# Patient Record
Sex: MALE | Race: WHITE | NOT HISPANIC OR LATINO | Employment: FULL TIME | URBAN - METROPOLITAN AREA
[De-identification: names, ages, dates, MRNs, and addresses within clinical notes are randomized per-mention and may not be internally consistent; named-entity substitution may affect disease eponyms.]

---

## 2019-02-06 ENCOUNTER — TRANSCRIBE ORDERS (OUTPATIENT)
Dept: ADMINISTRATIVE | Facility: HOSPITAL | Age: 29
End: 2019-02-06

## 2019-02-06 ENCOUNTER — LAB (OUTPATIENT)
Dept: LAB | Facility: HOSPITAL | Age: 29
End: 2019-02-06

## 2019-02-06 ENCOUNTER — HOSPITAL ENCOUNTER (OUTPATIENT)
Dept: GENERAL RADIOLOGY | Facility: HOSPITAL | Age: 29
Discharge: HOME | End: 2019-02-06

## 2019-02-06 ENCOUNTER — HOSPITAL ENCOUNTER (OUTPATIENT)
Dept: CARDIOLOGY | Facility: HOSPITAL | Age: 29
Discharge: HOME | End: 2019-02-06
Admitting: ORTHOPAEDIC SURGERY

## 2019-02-06 DIAGNOSIS — Z01.811 PRE-OP CHEST EXAM: ICD-10-CM

## 2019-02-06 DIAGNOSIS — Z01.818 PRE-OP TESTING: ICD-10-CM

## 2019-02-06 DIAGNOSIS — M25.562 LEFT KNEE PAIN, UNSPECIFIED CHRONICITY: ICD-10-CM

## 2019-02-06 DIAGNOSIS — M25.562 LEFT KNEE PAIN, UNSPECIFIED CHRONICITY: Primary | ICD-10-CM

## 2019-02-06 LAB
ANION GAP SERPL CALCULATED.3IONS-SCNC: 14 MMOL/L
BUN BLD-MCNC: 13 MG/DL (ref 6–20)
BUN/CREAT SERPL: 14.3 (ref 7–25)
CALCIUM SPEC-SCNC: 9.3 MG/DL (ref 8.6–10.5)
CHLORIDE SERPL-SCNC: 107 MMOL/L (ref 98–107)
CO2 SERPL-SCNC: 22 MMOL/L (ref 22–29)
CREAT BLD-MCNC: 0.91 MG/DL (ref 0.76–1.27)
DEPRECATED RDW RBC AUTO: 38.8 FL (ref 37–54)
ERYTHROCYTE [DISTWIDTH] IN BLOOD BY AUTOMATED COUNT: 12.8 % (ref 11.5–14.5)
GFR SERPL CREATININE-BSD FRML MDRD: 99 ML/MIN/1.73
GLUCOSE BLD-MCNC: 113 MG/DL (ref 65–99)
HCT VFR BLD AUTO: 42.5 % (ref 40.4–52.2)
HGB BLD-MCNC: 15 G/DL (ref 13.7–17.6)
MCH RBC QN AUTO: 29.9 PG (ref 27–32.7)
MCHC RBC AUTO-ENTMCNC: 35.3 G/DL (ref 32.6–36.4)
MCV RBC AUTO: 84.7 FL (ref 79.8–96.2)
PLATELET # BLD AUTO: 241 10*3/MM3 (ref 140–500)
PMV BLD AUTO: 10.9 FL (ref 6–12)
POTASSIUM BLD-SCNC: 4 MMOL/L (ref 3.5–5.2)
RBC # BLD AUTO: 5.02 10*6/MM3 (ref 4.6–6)
SODIUM BLD-SCNC: 143 MMOL/L (ref 136–145)
WBC NRBC COR # BLD: 5.39 10*3/MM3 (ref 4.5–10.7)

## 2019-02-06 PROCEDURE — 80048 BASIC METABOLIC PNL TOTAL CA: CPT

## 2019-02-06 PROCEDURE — 93010 ELECTROCARDIOGRAM REPORT: CPT | Performed by: INTERNAL MEDICINE

## 2019-02-06 PROCEDURE — 36415 COLL VENOUS BLD VENIPUNCTURE: CPT

## 2019-02-06 PROCEDURE — 93005 ELECTROCARDIOGRAM TRACING: CPT | Performed by: ORTHOPAEDIC SURGERY

## 2019-02-06 PROCEDURE — 85027 COMPLETE CBC AUTOMATED: CPT

## 2019-02-06 PROCEDURE — 87081 CULTURE SCREEN ONLY: CPT

## 2019-02-06 PROCEDURE — 71046 X-RAY EXAM CHEST 2 VIEWS: CPT

## 2019-02-08 LAB — MRSA SPEC QL CULT: NORMAL

## 2019-07-02 ENCOUNTER — TRANSCRIBE ORDERS (OUTPATIENT)
Dept: PHYSICAL THERAPY | Facility: HOSPITAL | Age: 29
End: 2019-07-02

## 2019-07-02 DIAGNOSIS — M25.562 LEFT KNEE PAIN, UNSPECIFIED CHRONICITY: Primary | ICD-10-CM

## 2019-07-11 ENCOUNTER — HOSPITAL ENCOUNTER (OUTPATIENT)
Dept: PHYSICAL THERAPY | Facility: HOSPITAL | Age: 29
Setting detail: THERAPIES SERIES
Discharge: HOME | End: 2019-07-11

## 2019-07-11 DIAGNOSIS — G89.29 CHRONIC PAIN OF LEFT KNEE: Primary | ICD-10-CM

## 2019-07-11 DIAGNOSIS — R26.2 DIFFICULTY WALKING: ICD-10-CM

## 2019-07-11 DIAGNOSIS — M25.562 CHRONIC PAIN OF LEFT KNEE: Primary | ICD-10-CM

## 2019-07-11 PROCEDURE — 97161 PT EVAL LOW COMPLEX 20 MIN: CPT | Performed by: PHYSICAL THERAPIST

## 2019-07-11 PROCEDURE — 97110 THERAPEUTIC EXERCISES: CPT | Performed by: PHYSICAL THERAPIST

## 2019-07-11 NOTE — THERAPY EVALUATION
"    Outpatient Physical Therapy Ortho Initial Evaluation  [facility]     Patient Name: Jack Cooper  : 1990  MRN: [id number]  Today's Date: 2019      Visit Date: 2019    There is no problem list on file for this patient.       No past medical history on file.     No past surgical history on file.    Visit Dx:     ICD-10-CM ICD-9-CM   1. Chronic pain of left knee M25.562 719.46    G89.29 338.29   2. Difficulty walking R26.2 719.7         Patient History     Row Name 19 1500             History    Chief Complaint  Pain  -      Type of Pain  Knee pain  -      Date Current Problem(s) Began  -- 2018  -      Brief Description of Current Complaint  Patient injured his knee back in 2018. 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 His knee swelled up badly and there was no medical attention on the 10+ hour flight. By the time he landed the swelling/pain had temporarily subsided. He never felt like the pain was gone and after a week or so the pain slowly began worsening again, likely from his daily job duties. He went to a urgent care type clinic and they referred him to an Ortho MD. He took an MRI which showed a meniscocapsular tear. Patient had a knee scope in February but when the surgeon went in it wasn't the injury he thought it was so he sewed him back up without fixing anything. His MD just recommended PT.    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  Yes, Dr. Wasserman  -      How has patient tried to help current problem?  surgery  -      What clinical " tests have you had for this problem?  MRI  -         Pain     Pain Location  Knee  -KH      Pain at Present  3  -KH      Pain at Best  3  -KH      Pain at Worst  8  -KH      Pain Frequency  Constant/continuous  -KH      Pain Description  Dull;Throbbing  -KH      What Performance Factors Make the Current Problem(s) WORSE?  bending, walking, standing   -KH      What Performance Factors Make the Current Problem(s) BETTER?  change position  -KH      Is your sleep disturbed?  No  -KH      Difficulties at work?  yes, unable to complete job duties  -KH      Difficulties with ADL's?  no  -KH      Difficulties with recreational activities?  yes-unable to hike/camp, runs/works out  -KH         Fall Risk Assessment    Any falls in the past year:  No  -KH         Daily Activities    Primary Language  English  -KH      Patient is concerned about/has problems with  Climbing Stairs;Performing home management (household chores, shopping, care of dependents);Performing job responsibilities/community activities (work, school,;Performing sports, recreation, and play activities;Standing;Transfers (getting out of a chair, bed);Walking  -KH      Pt Participated in POC and Goals  Yes  -KH         Safety    Are you being hurt, hit, or frightened by anyone at home or in your life?  No  -KH      Are you being neglected by a caregiver  No  -KH        User Key  (r) = Recorded By, (t) = Taken By, (c) = Cosigned By    Initials Name Provider Type    Nilam Menendez, PT Physical Therapist          PT Ortho     Row Name 07/11/19 1500       Subjective Pain    Able to rate subjective pain?  yes  -KH    Pre-Treatment Pain Level  3  -KH    Post-Treatment Pain Level  3  -KH       Knee Special Tests    Anterior drawer (ACL lesion)  Left:;Positive  -KH    Posterior drawer (PCL lesion)  Bilateral:;Negative  -KH    Valgus stress (MCL lesion)  Left:;Positive  -KH    Varus stress (LCL lesion)  Bilateral:;Negative  -KH    Yu’s test (meniscal lesion)   Bilateral:;Negative  -KH    Patellar grind test (chondromalacia patella)  Bilateral:;Negative  -KH       General ROM    RT Lower Ext  Rt Knee Extension/Flexion  -KH    LT Lower Ext  Lt Knee Extension/Flexion  -KH       Left Lower Ext    Lt Knee Extension/Flexion AROM  0-80 degrees  -KH       MMT (Manual Muscle Testing)    Rt Lower Ext  Rt Hip Flexion;Rt Hip Extension;Rt Hip ABduction;Rt Knee Extension;Rt Knee Flexion;Rt Ankle Plantarflexion;Rt Ankle Dorsiflexion  -KH    Lt Lower Ext  Lt Hip Flexion;Lt Hip ABduction;Lt Hip Extension;Lt Knee Extension;Lt Knee Flexion  -KH       MMT Right Lower Ext    Rt Hip Flexion MMT, Gross Movement  (5/5) normal  -KH    Rt Hip Extension MMT, Gross Movement  (5/5) normal  -KH    Rt Hip ABduction MMT, Gross Movement  (5/5) normal  -KH    Rt Knee Extension MMT, Gross Movement  (5/5) normal  -KH    Rt Knee Flexion MMT, Gross Movement  (5/5) normal  -KH    Rt Ankle Plantarflexion MMT, Gross Movement  (5/5) normal  -KH    Rt Ankle Dorsiflexion MMT, Gross Movement  (5/5) normal  -KH       MMT Left Lower Ext    Lt Hip Flexion MMT, Gross Movement  (4-/5) good minus  -KH    Lt Hip ABduction MMT, Gross Movement  (4-/5) good minus  -KH    Lt Knee Extension MMT, Gross Movement  (3+/5) fair plus  -KH    Lt Knee Flexion MMT, Gross Movement  (3+/5) fair plus  -KH       Sensation    Sensation WNL?  WNL  -KH       Lower Extremity Flexibility    Hamstrings  Bilateral:;Moderately limited  -KH       Gait/Stairs Assessment/Training    Comment (Gait/Stairs)  mild L LE antalgia during gait  -KH      User Key  (r) = Recorded By, (t) = Taken By, (c) = Cosigned By    Initials Name Provider Type    Nilam Menendez, PT Physical Therapist                            PT OP Goals     Row Name 07/11/19 1600          PT Short Term Goals    STG 1  Patient will demonstrate compliance and indpendence with initial HEP  -KH     STG 2  Patient will increase L knee flexion AROM to 100 degrees to reduce pain with getting  "in/out of car  -     STG 3  Patient will increase L knee ext/flex strength to 4/5 to increase knee stability during gait  -        Long Term Goals    LTG 1  Patient will see ortho MD for follow up to determine cause of knee pain via MRI or other imaging  -     LTG 2  Patient will increase L knee flexion AROM to 115 degrees to allow patient to negotiate stairs more easily  -     LTG 3  Patient will ambulate with even step length, no antalgia, and minimal pain  -       User Key  (r) = Recorded By, (t) = Taken By, (c) = Cosigned By    Initials Name Provider Type     Nilam Rich, PT Physical Therapist          PT Assessment/Plan     Row Name 07/11/19 1614          PT Assessment    Functional Limitations  Performance in leisure activities;Impaired gait;Decreased safety during functional activities;Limitations in functional capacity and performance;Limitations in community activities;Performance in work activities;Performance in sport activities;Limitation in home management;Performance in self-care ADL  -     Impairments  Balance;Range of motion;Muscle strength;Pain;Joint integrity;Joint mobility;Endurance;Gait  -     Assessment Comments  Jack Cooper is a 29 y.o. year-old male referred to physical therapy for left knee pain.Patient injured his knee back in September of 2018. 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 He presents with a evolving clinical presentation.  He has no relevant comorbidities or personal factors  That should affect his progress in the plan of care.  Signs and symptoms are consistent with physical therapy diagnosis of chronic left knee pain. Objective findings include severely limited L knee flexion AROM, decreased L LE strength, antalgic gait, and possible MCL/ACL tear based on description of injury and special testing. Patient is appropriate for skilled physical therapy in order " to reduce pain and increase ease with daily mobility.   -     Please refer to paper survey for additional self-reported information  Yes  -KH     Rehab Potential  Good  -KH     Patient/caregiver participated in establishment of treatment plan and goals  Yes  -KH     Patient would benefit from skilled therapy intervention  Yes  -KH        PT Plan    PT Frequency  2x/week  -     Predicted Duration of Therapy Intervention (Therapy Eval)  4-6 weeks  -KH     Planned CPT's?  PT EVAL LOW COMPLEXITY: 84841;PT RE-EVAL: 18268;PT MANUAL THERAPY EA 15 MIN: 08220;PT HOT OR COLD PACK TREAT MCARE;PT ULTRASOUND EA 15 MIN: 07788;PT TRACTION LUMBAR: 05253;PT ELECTRICAL STIM UNATTEND: ;PT AQUATIC THERAPY EA 15 MIN: 22299;PT NEUROMUSC RE-EDUCATION EA 15 MIN: 90049;PT THER PROC EA 15 MIN: 00544;PT THER ACT EA 15 MIN: 84962;PT SELF CARE/HOME MGMT/TRAIN EA 15: 95799;PT GAIT TRAINING EA 15 MIN: 27837;PT ELECTRICAL STIM ATTD EA 15 MIN: 19265  -     Physical Therapy Interventions (Optional Details)  aquatics exercise;balance training;patient/family education;ROM (Range of Motion);stair training;strengthening;stretching;taping;transfer training;gross motor skills;home exercise program;gait training;neuromuscular re-education;orthotic fitting/training;modalities;manual therapy techniques;joint mobilization;dry needling  -     PT Plan Comments  L knee AROM and strength, gait training, biking, K-tape/modalaties as needed.  -       User Key  (r) = Recorded By, (t) = Taken By, (c) = Cosigned By    Initials Name Provider Type    Nilam Menendez, PT Physical Therapist            Exercises     Row Name 07/11/19 1500             Subjective Pain    Able to rate subjective pain?  yes  -KH      Pre-Treatment Pain Level  3  -KH      Post-Treatment Pain Level  3  -KH         Total Minutes    68165 - PT Therapeutic Exercise Minutes  10  -KH         Exercise 1    Exercise Name 1  Quad sets ([id number]); LAQ (60i4evp);  -KH      Cueing 1  Verbal   -WINDY        User Key  (r) = Recorded By, (t) = Taken By, (c) = Cosigned By    Initials Name Provider Type    Nilam Menendez, PT Physical Therapist                 Time Calculation:     Start Time: 1530  Stop Time: 1610  Time Calculation (min): 40 min     Therapy Charges for Today     Code Description Service Date Service Provider Modifiers Qty    57800389208  PT THER PROC EA 15 MIN 7/11/2019 Nilam Rich, PT GP 1    [id number]  PT EVAL LOW COMPLEXITY 2 7/11/2019 Nilam Rich, PT GP 1                    Nilam Rich, KEVIN  7/11/2019

## 2019-07-17 ENCOUNTER — HOSPITAL ENCOUNTER (OUTPATIENT)
Dept: PHYSICAL THERAPY | Facility: HOSPITAL | Age: 29
Setting detail: THERAPIES SERIES
Discharge: HOME | End: 2019-07-17

## 2019-07-17 DIAGNOSIS — M25.562 CHRONIC PAIN OF LEFT KNEE: Primary | ICD-10-CM

## 2019-07-17 DIAGNOSIS — G89.29 CHRONIC PAIN OF LEFT KNEE: Primary | ICD-10-CM

## 2019-07-17 DIAGNOSIS — R26.2 DIFFICULTY WALKING: ICD-10-CM

## 2019-07-17 PROCEDURE — 97110 THERAPEUTIC EXERCISES: CPT | Performed by: PHYSICAL THERAPIST

## 2019-07-17 NOTE — THERAPY TREATMENT NOTE
Outpatient Physical Therapy Ortho Treatment Note  [facility]     Patient Name: Jack Cooper  : 1990  MRN: [id number]  Today's Date: 2019      Visit Date: 2019    Visit Dx:    ICD-10-CM ICD-9-CM   1. Chronic pain of left knee M25.562 719.46    G89.29 338.29   2. Difficulty walking R26.2 719.7       There is no problem list on file for this patient.       No past medical history on file.     No past surgical history on file.                    PT Assessment/Plan     Row Name 19 1352          PT Assessment    Assessment Comments  Patient seen for first f/u since evaluation reporting constant low level ache L inferior anterior knee with increased pain on WB and intermittent sharp pains.  Demonstrates antalgic/compensated gait.  Very weak with isolated quad contraction - observed visible muscle quivering.  Patient apprehensive with end range motion flexion and extension 2/2 pain.  Attempted heel slides but switched to seated knee flexion for ROM (added to HEP).  Slow/cautious with L knee end ROM on NuStep (using UE to help assist/control movement.  Patient noted increased pain and muscle fatigue post tx.  Encouraged patient to ice at home.  -RA        PT Plan    PT Plan Comments  L knee ROM, strength, gait training, bike, KT, modalities prn   -RA       User Key  (r) = Recorded By, (t) = Taken By, (c) = Cosigned By    Initials Name Provider Type    Bridgett Little, PT Physical Therapist            Exercises     Row Name 19 1300             Subjective Comments    Subjective Comments  aching inferior patella pretty much all the time, can get medial and lateral knee pain with WB activity especially if twists/turns wrong on the leg.    -RA         Subjective Pain    Able to rate subjective pain?  yes  -RA      Pre-Treatment Pain Level  3  -RA      Subjective Pain Comment  dull   -RA         Total Minutes    46190 - PT Therapeutic Exercise Minutes  40  -RA         Exercise 1     Exercise Name 1  Quad sets (54t64fry); LAQ (73g4sge); trial of SAQ x 4 reps - D/C; trial of heel slides - switched to seated knee flexion AROM' SLR x 10;, Standing hip abd x 10;, NuStep L4 x 6 min (UE/LE) working on knee flexion ROM   -RA      Cueing 1  Verbal;Demo;Tactile  -RA        User Key  (r) = Recorded By, (t) = Taken By, (c) = Cosigned By    Initials Name Provider Type    Bridgett Little, PT Physical Therapist                           Therapy Education  Given: Symptoms/condition management, Pain management, HEP  Program: New, Reinforced  How Provided: Verbal, Demonstration, Written  Provided to: Patient  Level of Understanding: Teach back education performed, Verbalized              Time Calculation:   Start Time: 1345  Stop Time: 1425  Time Calculation (min): 40 min  Therapy Charges for Today     Code Description Service Date Service Provider Modifiers Qty    38001345216 HC PT THER PROC EA 15 MIN 7/17/2019 Bridgett Arredondo, PT GP 3                    Bridgett Arredondo PT  7/17/2019

## 2019-07-19 ENCOUNTER — HOSPITAL ENCOUNTER (OUTPATIENT)
Dept: PHYSICAL THERAPY | Facility: HOSPITAL | Age: 29
Setting detail: THERAPIES SERIES
Discharge: HOME | End: 2019-07-19

## 2019-07-19 DIAGNOSIS — M25.562 CHRONIC PAIN OF LEFT KNEE: Primary | ICD-10-CM

## 2019-07-19 DIAGNOSIS — G89.29 CHRONIC PAIN OF LEFT KNEE: Primary | ICD-10-CM

## 2019-07-19 DIAGNOSIS — R26.2 DIFFICULTY WALKING: ICD-10-CM

## 2019-07-19 PROCEDURE — 97112 NEUROMUSCULAR REEDUCATION: CPT

## 2019-07-19 PROCEDURE — 97110 THERAPEUTIC EXERCISES: CPT

## 2019-07-19 PROCEDURE — 97035 APP MDLTY 1+ULTRASOUND EA 15: CPT

## 2019-07-19 NOTE — THERAPY TREATMENT NOTE
Outpatient Physical Therapy Ortho Treatment Note  [facility]     Patient Name: Jack Cooper  : 1990  MRN: [id number]  Today's Date: 2019      Visit Date: 2019    Visit Dx:    ICD-10-CM ICD-9-CM   1. Chronic pain of left knee M25.562 719.46    G89.29 338.29   2. Difficulty walking R26.2 719.7       There is no problem list on file for this patient.       No past medical history on file.     No past surgical history on file.                    PT Assessment/Plan     Row Name 19 1600          PT Assessment    Assessment Comments  Mr. Cooper returns today with reports of high level knee pain and impaired/antalgic gait observed in clinic. He reports compliance with HEP which is increasing knee pain per pt report, modified HEP. Focus today on pain relief including gentle PROM on nustep, KT tape for patellar support, and ultrasound, which pt report decrease pain following US. Lengthy discussion on transition to aquatic environment to begin strengthening in gravity minimized environment. Pt verb good understanding.   -CA        PT Plan    PT Plan Comments  Assess response to US and KT tape, f/u about scheduled aquatic appointments, discuss possible cancellation of land appointments while waiting for aqua therapy if determine no benefit from land at this time. Discuss possible second opinion/return to ortho doc for f/u about knee scope etc.  -CA       User Key  (r) = Recorded By, (t) = Taken By, (c) = Cosigned By    Initials Name Provider Type    Kassy Phan, PT Physical Therapist          Modalities     Row Name 19 1600             Ultrasound 22503    Location  L knee: anteromedial joint line  -CA      Duty Cycle  100  -CA      Frequency  1.0 MHz  -CA      Intensity - Wts/cm  1.2  -CA      47580 - PT Ultrasound Minutes  15  -CA        User Key  (r) = Recorded By, (t) = Taken By, (c) = Cosigned By    Initials Name Provider Type    Kassy Phan, KEVIN Physical Therapist     "    Exercises     Row Name 07/19/19 1600             Subjective Comments    Subjective Comments  Cont to have anteriomedial knee pain  -CA         Total Minutes    45745 - PT Therapeutic Exercise Minutes  15 includes education on aqua therapy; modifications to HEP  -CA      98411 -  PT Neuromuscular Reeducation Minutes  10  -CA         Exercise 1    Exercise Name 1  Nu step 10 min: using UE and R LE for L Knee PROM  -CA      Additional Comments  small range  -CA         Exercise 2    Exercise Name 2  KT tape: \"horseshoe\" - one strip of tape measured from lateral to medial knee across mid-patella. tape split lengthwise except for last 1-2\" of it. 1-2\" anchor placed over lateral knee and each strip frames superior and inferior patella at approx 80% tension, while meeting on medial portion of patella  -CA      Cueing 2  Verbal  -CA      Time 2  10 min  -CA        User Key  (r) = Recorded By, (t) = Taken By, (c) = Cosigned By    Initials Name Provider Type    CA Kassy Fuentes, PT Physical Therapist                                          Time Calculation:   Start Time: 1520  Stop Time: 1600  Time Calculation (min): 40 min  Total Timed Code Minutes- PT: 40 minute(s)  Therapy Charges for Today     Code Description Service Date Service Provider Modifiers Qty    86984128484 HC PT NEUROMUSC RE EDUCATION EA 15 MIN 7/19/2019 Kassy Fuentes, PT GP 1    04617117503 HC PT THER PROC EA 15 MIN 7/19/2019 Kassy Fuentes, PT GP 1    40787176582 HC PT ULTRASOUND EA 15 MIN 7/19/2019 Kassy Fuentes, PT GP 1                    Kassy Fuentes PT  7/19/2019     "

## 2019-07-29 ENCOUNTER — HOSPITAL ENCOUNTER (OUTPATIENT)
Dept: PHYSICAL THERAPY | Facility: HOSPITAL | Age: 29
Setting detail: THERAPIES SERIES
End: 2019-07-29

## 2019-12-05 ENCOUNTER — OFFICE VISIT (OUTPATIENT)
Dept: ORTHOPEDIC SURGERY | Facility: CLINIC | Age: 29
End: 2019-12-05

## 2019-12-05 VITALS — BODY MASS INDEX: 33.13 KG/M2 | HEIGHT: 73 IN | TEMPERATURE: 98.8 F | WEIGHT: 250 LBS

## 2019-12-05 DIAGNOSIS — M25.862 PATELLOFEMORAL DYSFUNCTION OF LEFT KNEE: ICD-10-CM

## 2019-12-05 DIAGNOSIS — M25.562 LEFT KNEE PAIN, UNSPECIFIED CHRONICITY: Primary | ICD-10-CM

## 2019-12-05 PROCEDURE — 73562 X-RAY EXAM OF KNEE 3: CPT | Performed by: ORTHOPAEDIC SURGERY

## 2019-12-05 PROCEDURE — 99204 OFFICE O/P NEW MOD 45 MIN: CPT | Performed by: ORTHOPAEDIC SURGERY

## 2019-12-05 PROCEDURE — 20610 DRAIN/INJ JOINT/BURSA W/O US: CPT | Performed by: ORTHOPAEDIC SURGERY

## 2019-12-05 RX ADMIN — METHYLPREDNISOLONE ACETATE 80 MG: 80 INJECTION, SUSPENSION INTRA-ARTICULAR; INTRALESIONAL; INTRAMUSCULAR; SOFT TISSUE at 14:32

## 2019-12-05 NOTE — PROGRESS NOTES
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  It happened September 2018 he was training in [place] and getting ready to leave they were loading the plane when he was pushing heavy item and his knee popped he had severe pain at that time he is subsequent he had a pretty normal MRI and arthroscopy that was normal.  He has persistent pain about the medial aspect of his knee he was on light duty and now has not worked.  Current symptoms are moderate severe intermittent to constant depending on the day grinding stabbing aching clicking worse with standing driving walking somewhat better with rest he is in the [employer] past medical history smart for depression anxiety        Allergies: No Known Allergies    Medications:   Home Medications:  No current outpatient medications on file prior to visit.     No current facility-administered medications on file prior to visit.      Current Medications:  Scheduled Meds:  Continuous Infusions:  No current facility-administered medications for this visit.   PRN Meds:.    History reviewed. No pertinent past medical history.   History reviewed. No pertinent surgical history.     Social History     Occupational History   • Not on file   Tobacco Use   • Smoking status: Never Smoker   • Smokeless tobacco: Never Used   Substance and Sexual Activity   • Alcohol use: Defer   • Drug use: Defer   • Sexual activity: Defer      Social History     Social History Narrative   • Not on file      History reviewed. No pertinent family history.          Review of Systems: 14 point review of systems are remarkable for the pertinent positives listed in the chart by the patient the remainder negative    Review of Systems      Physical Exam: 29 y.o. male  General  "Appearance:    Alert, cooperative, in no acute distress                   Vitals:    12/05/19 1410   Temp: 98.8 °F (37.1 °C)   Weight: 113 kg (250 lb)   Height: 185.4 cm (73\")      Patient is alert and read ×3 no acute distress appears her above-listed at height weight and age.  Affect is normal respiratory rate is normal unlabored. Heart rate regular rate rhythm, sclera, dentition and hearing are normal for the purpose of this exam.        Ortho Exam  .Physical exam of the left knee reveals no effusion no redness.  The patient does have tenderness about the medial joint line.  No tenderness about the lateral joint line.  A negative bounce home and a positive medial Yu.  There is some pain medially  with a lateral Yu.  Patient has a stable ligamentous exam.  Quads are reasonable and symmetric bilaterally.  Calf is soft and nontender.  There is no overlying skin changes no lymphedema lymphadenopathy.  Patient has good hip range of motion full symmetric and asymptomatic and a normal ankle exam.  Large Joint Arthrocentesis: L knee  Date/Time: 12/5/2019 2:32 PM  Consent given by: patient  Site marked: site marked  Timeout: Immediately prior to procedure a time out was called to verify the correct patient, procedure, equipment, support staff and site/side marked as required   Supporting Documentation  Indications: pain and joint swelling   Procedure Details  Location: knee - L knee  Preparation: Patient was prepped and draped in the usual sterile fashion  Needle size: 22 G  Approach: anterolateral  Medications administered: 80 mg methylPREDNISolone acetate 80 MG/ML; 2 mL lidocaine (cardiac)  Patient tolerance: patient tolerated the procedure well with no immediate complications                   Radiology:   AP, Lateral and merchant views of the left knee  were ordered/reviewed to evauateknee pain.  I have compared to previous films these are normal he also has it comes with an MRI which I have reviewed which " is normal  Imaging Results (Most Recent)     Procedure Component Value Units Date/Time    XR Knee 3 View Left [[id number]] Resulted:  12/05/19 1338     Updated:  12/05/19 1401    Impression:       Ordering physician's impression is located in the Encounter Note dated 12/05/19. X-ray performed in the DR room.          Assessment/Plan:      Left knee pain this been ongoing for over a year.  He is never had an injection I think that is quite reasonable to try to get this calm down some of his symptoms are anterior in the area of the patellar tendon I will not inject that area obviously I will inject the joint have him really work on stretching quads and hamstrings and I will see him back in about 3 weeks we will keep him on his same restrictions

## 2019-12-09 RX ORDER — METHYLPREDNISOLONE ACETATE 80 MG/ML
80 INJECTION, SUSPENSION INTRA-ARTICULAR; INTRALESIONAL; INTRAMUSCULAR; SOFT TISSUE
Status: COMPLETED | OUTPATIENT
Start: 2019-12-05 | End: 2019-12-05

## 2020-01-06 ENCOUNTER — OFFICE VISIT (OUTPATIENT)
Dept: ORTHOPEDIC SURGERY | Facility: CLINIC | Age: 30
End: 2020-01-06

## 2020-01-06 VITALS — BODY MASS INDEX: 31.81 KG/M2 | WEIGHT: 240 LBS | HEIGHT: 73 IN | TEMPERATURE: 98.4 F

## 2020-01-06 DIAGNOSIS — S83.242D ACUTE MEDIAL MENISCUS TEAR OF LEFT KNEE, SUBSEQUENT ENCOUNTER: Primary | ICD-10-CM

## 2020-01-06 PROCEDURE — 99212 OFFICE O/P EST SF 10 MIN: CPT | Performed by: ORTHOPAEDIC SURGERY

## 2020-01-06 NOTE — PROGRESS NOTES
"Left Knee Follow Up      Patient: Jack Cooper        YOB: 1990            Chief Complaints: left knee pain      History of Present Illness:      Physical Exam: 29 y.o. male  General Appearance:    Alert, cooperative, in no acute distress                   Vitals:    01/06/20 1541   Temp: 98.4 °F (36.9 °C)   Weight: 109 kg (240 lb)   Height: 185.4 cm (73\")        Patient is alert and read ×3 no acute distress appears her above-listed at height weight and age.  Affect is normal respiratory rate is normal unlabored. Heart rate regular rate rhythm, sclera, dentition and hearing are normal for the purpose of this exam.      Ortho Exam                 Assessment/Plan:            "

## 2020-01-06 NOTE — PROGRESS NOTES
"Left Knee Follow Up      Patient: Jack Cooper        YOB: 1990            Chief Complaints: Left knee pain      History of Present Illness: This is a patient with a one-year history of left knee pain he has had previous arthroscopy still has significant activity limiting symptoms a lot of them are anterior but summer medial I injected him last visit he did not get much relief from the injection at least it was temporary at best.      Physical Exam: 29 y.o. male  General Appearance:    Alert, cooperative, in no acute distress                   Vitals:    01/06/20 1541   Temp: 98.4 °F (36.9 °C)   Weight: 109 kg (240 lb)   Height: 185.4 cm (73\")        Patient is alert and read ×3 no acute distress appears her above-listed at height weight and age.  Affect is normal respiratory rate is normal unlabored. Heart rate regular rate rhythm, sclera, dentition and hearing are normal for the purpose of this exam.      Ortho Exam     Physical exam of the left knee reveals no effusion no redness.  The patient does have tenderness about the medial joint line.  No tenderness about the lateral joint line.  A negative bounce home and a positive medial Yu.  There is some pain medially  with a lateral Yu.  Patient has a stable ligamentous exam.  Quads are reasonable and symmetric bilaterally.  Calf is soft and nontender.  There is no overlying skin changes no lymphedema lymphadenopathy.  Patient has good hip range of motion full symmetric and asymptomatic and a normal ankle exam.  He does have some tenderness and some subjective complaints anteriorly in the area the patellar tendon      X-rays are normal    Assessment/Plan: Persistent left knee pain I think it is reasonable to proceed with an MRI is been over a year since he had an MRI if that MRI is normal I will probably unfortunately have to say that do not have any options for him.  Hopefully the MRI will l lead us to a diagnosis and we can proceed " based on that

## 2020-01-22 ENCOUNTER — HOSPITAL ENCOUNTER (OUTPATIENT)
Dept: MRI IMAGING | Facility: HOSPITAL | Age: 30
Discharge: HOME | End: 2020-01-22
Admitting: ORTHOPAEDIC SURGERY

## 2020-01-22 DIAGNOSIS — S83.242D ACUTE MEDIAL MENISCUS TEAR OF LEFT KNEE, SUBSEQUENT ENCOUNTER: ICD-10-CM

## 2020-01-22 PROCEDURE — 73721 MRI JNT OF LWR EXTRE W/O DYE: CPT

## 2020-02-04 ENCOUNTER — OFFICE VISIT (OUTPATIENT)
Dept: ORTHOPEDIC SURGERY | Facility: CLINIC | Age: 30
End: 2020-02-04

## 2020-02-04 VITALS — WEIGHT: 261.2 LBS | HEIGHT: 73 IN | TEMPERATURE: 98.2 F | BODY MASS INDEX: 34.62 KG/M2

## 2020-02-04 DIAGNOSIS — M25.862 PATELLOFEMORAL DYSFUNCTION OF LEFT KNEE: Primary | ICD-10-CM

## 2020-02-04 PROCEDURE — 99213 OFFICE O/P EST LOW 20 MIN: CPT | Performed by: ORTHOPAEDIC SURGERY

## 2020-02-04 NOTE — PROGRESS NOTES
Left Knee MRI Follow Up      Patient: Jack Cooper        YOB: 1990            Chief Complaints:left  Knee pain      History of Present Illness: The patient is here follow-up of an MRI of the knee MRI demonstrates a small effusion no meniscus tear or other internal derangement.  He still has pain on the anterior aspect of his knee he does have a little hypersensitivity there and symptoms have persisted and are activity limiting      Physical Exam: 29 y.o. male  General Appearance:    Alert, cooperative, in no acute distress                 There were no vitals filed for this visit.     Patient is alert and read ×3 no acute distress appears her above-listed at height weight and age.  Affect is normal respiratory rate is normal unlabored. Heart rate regular rate rhythm, sclera, dentition and hearing are normal for the purpose of this exam.      Ortho Exam  Physical exam of the left knee reveals no effusion, no erythema.  The patient has no palpable tenderness along the medial joint line, no tenderness about the lateral joint line.  Patient does have crepitus with patellofemoral range of motion.  They also have subjective symptoms anteriorly during exam.  The patient has a negative bounce home, negative Yu and a stable ligamentous exam.  Quad tone is reasonable and symmetric.  There are no overlying skin changes no lymphedema no lymphadenopathy.  There is good hip range of motion which is full symmetric and asymptomatic and a normal ankle exam.  Hamstrings and IT band are tight bilaterally.      MRI Results: Are as above I reviewed the films myself and agree with the findings prior x-rays are normal as well    Procedures      Assessment/Plan: Persistent left knee pain despite arthroscopy in the past and now with repeat negative MRI.  I told him I do question his pain however I am not exactly sure what is causing it.  He may have some irritation of a nerve anteriorly and has some hypersensitivity  there.  Do not think there is an internal derangement to the knee.  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  I have encouraged him to continue to work on quad strength core strength weight loss etc. he will follow-up as needed

## 2020-03-16 ENCOUNTER — DOCUMENTATION (OUTPATIENT)
Dept: PHYSICAL THERAPY | Facility: CLINIC | Age: 30
End: 2020-03-16

## 2020-03-16 DIAGNOSIS — G89.29 CHRONIC PAIN OF LEFT KNEE: Primary | ICD-10-CM

## 2020-03-16 DIAGNOSIS — M25.562 CHRONIC PAIN OF LEFT KNEE: Primary | ICD-10-CM

## 2020-03-16 DIAGNOSIS — R26.2 DIFFICULTY WALKING: ICD-10-CM

## 2021-04-16 ENCOUNTER — BULK ORDERING (OUTPATIENT)
Dept: CASE MANAGEMENT | Facility: OTHER | Age: 31
End: 2021-04-16

## 2021-04-16 DIAGNOSIS — Z23 IMMUNIZATION DUE: ICD-10-CM
